# Patient Record
Sex: MALE | Race: BLACK OR AFRICAN AMERICAN | Employment: FULL TIME | URBAN - METROPOLITAN AREA
[De-identification: names, ages, dates, MRNs, and addresses within clinical notes are randomized per-mention and may not be internally consistent; named-entity substitution may affect disease eponyms.]

---

## 2017-01-25 ENCOUNTER — DOCUMENTATION ONLY (OUTPATIENT)
Dept: INTERNAL MEDICINE CLINIC | Age: 62
End: 2017-01-25

## 2017-01-25 ENCOUNTER — OFFICE VISIT (OUTPATIENT)
Dept: INTERNAL MEDICINE CLINIC | Age: 62
End: 2017-01-25

## 2017-01-25 VITALS
HEART RATE: 82 BPM | BODY MASS INDEX: 27.78 KG/M2 | OXYGEN SATURATION: 96 % | TEMPERATURE: 98 F | DIASTOLIC BLOOD PRESSURE: 80 MMHG | SYSTOLIC BLOOD PRESSURE: 130 MMHG | HEIGHT: 67 IN | WEIGHT: 177 LBS

## 2017-01-25 DIAGNOSIS — F33.1 MODERATE EPISODE OF RECURRENT MAJOR DEPRESSIVE DISORDER (HCC): ICD-10-CM

## 2017-01-25 DIAGNOSIS — M72.2 PLANTAR FASCIITIS OF RIGHT FOOT: ICD-10-CM

## 2017-01-25 DIAGNOSIS — E03.4 HYPOTHYROIDISM DUE TO ACQUIRED ATROPHY OF THYROID: ICD-10-CM

## 2017-01-25 DIAGNOSIS — E55.9 VITAMIN D DEFICIENCY: ICD-10-CM

## 2017-01-25 DIAGNOSIS — I10 ESSENTIAL HYPERTENSION: Primary | ICD-10-CM

## 2017-01-25 DIAGNOSIS — F51.01 PRIMARY INSOMNIA: ICD-10-CM

## 2017-01-25 DIAGNOSIS — E11.42 DIABETIC POLYNEUROPATHY ASSOCIATED WITH TYPE 2 DIABETES MELLITUS (HCC): ICD-10-CM

## 2017-01-25 LAB
CHOLEST SERPL-MCNC: 169 MG/DL
GLUCOSE POC: 213 MG/DL
HBA1C MFR BLD HPLC: 8.6 %
HDLC SERPL-MCNC: 65 MG/DL
LDL CHOLESTEROL POC: 90
NON-HDL GOAL (POC): 104
TCHOL/HDL RATIO (POC): 2.6
TRIGL SERPL-MCNC: 71 MG/DL

## 2017-01-25 RX ORDER — PAROXETINE 10 MG/1
10 TABLET, FILM COATED ORAL DAILY
Qty: 30 TAB | Refills: 3 | Status: SHIPPED | OUTPATIENT
Start: 2017-01-25 | End: 2017-01-25 | Stop reason: ALTCHOICE

## 2017-01-25 RX ORDER — ERGOCALCIFEROL 1.25 MG/1
50000 CAPSULE ORAL
Qty: 4 CAP | Refills: 12 | Status: SHIPPED | OUTPATIENT
Start: 2017-01-25

## 2017-01-25 RX ORDER — TRAZODONE HYDROCHLORIDE 100 MG/1
100 TABLET ORAL
Qty: 30 TAB | Refills: 3 | Status: SHIPPED | OUTPATIENT
Start: 2017-01-25 | End: 2017-04-26 | Stop reason: SDUPTHER

## 2017-01-25 RX ORDER — INSULIN GLARGINE 100 [IU]/ML
INJECTION, SOLUTION SUBCUTANEOUS
Qty: 3 VIAL | Refills: 3
Start: 2017-01-25

## 2017-01-25 NOTE — PROGRESS NOTES
This writer has spoken with pt during today's ov about his request for more Lantus. After contacting [employer] this writer is informed the application completed in June 2016 is good for one year but when pt needs a refill a new re-order form has to be completed. They only send 4 vials at a time. This writer has received a faxed copy of that re-order form, its been completed and faxed back to [facility]. Pt, Dr Noreen Marie and Dory Lacy are aware thus will monitor for it's arrival. (Completed and blank documents are being scanned into the chart- it's ok to make copies and change date as needed per [employer] rep).

## 2017-01-25 NOTE — PROGRESS NOTES
Negin Odom is a 59 y.o. male and presents with Insomnia and Diabetes  . Subjective:  Diabetes Mellitus Review:  He has diabetes mellitus. Diabetic ROS - medication compliance: compliant all of the time, diabetic diet compliance: compliant all of the time, home glucose monitoring: is performed. Known diabetic complications:numbness and tingling in hands and feet  Cardiovascular risk factors: family history, dyslipidemia, diabetes mellitus, obesity, hypertension  Current diabetic medications include /insulin   Eye exam current (within one year): no  Weight trend: stable  Prior visit with dietician: no  Current diet: \"healthy\" diet  in general  Current exercise: walking  Current monitoring regimen: home blood tests - daily  Home blood sugar records: trend: stable  Any episodes of hypoglycemia? no  Is He on ACE inhibitor or angiotensin II receptor blocker? Yes     Depression Review:  Patient is seen for followup of depression. Ongoing depressed mood, insomnia, psychomotor retardation, fatigue, feelings of worthlessness/guilt, difficulty concentrating and hopelessness Treatment includes no medication and no other therapies. He denies recurrent thoughts of death and suicidal thoughts without plan. He experiences the following side effects from the treatment: none. He states he is dealing with a lot of legal issues. Plantar fascia review:  Pains in thert foot  are report to be worse in the morning upon standing that are improved as the day progresses      Thyroid Review:  Patient is seen for followup of hypothyroidism. He has not been on his medication as he should have  Thyroid ROS: has fatigue,  heat/cold intolerance, bowel/skin changes or CVS symptoms.   Taking medication as prescribed  Last TSH was high     He has vitamin d deficiency      Review of Systems  Constitutional: negative for fevers, chills, anorexia and weight loss  Eyes:   negative for visual disturbance and irritation  ENT:   negative for tinnitus,sore throat,nasal congestion,ear pains. hoarseness  Respiratory:  negative for cough, hemoptysis, dyspnea,wheezing  CV:   negative for chest pain, palpitations, lower extremity edema  GI:   negative for nausea, vomiting, diarrhea, abdominal pain,melena  Endo:               negative for polyuria,polydipsia,polyphagia,heat intolerance  Genitourinary: negative for frequency, dysuria and hematuria  Integument:  negative for rash and pruritus  Hematologic:  negative for easy bruising and gum/nose bleeding  Musculoskel:myalgias, arthralgias,joint pain. rt. foot tenderness to palpation plantar surface  Neurological:  negative for headaches, dizziness, vertigo, memory problems and gait   Behavl/Psych: feelings of anxiety, depression, mood changes    Past Medical History   Diagnosis Date    Arthritis      knees bilaterally    Diabetes ([address]      type 2, new dx    GERD (gastroesophageal reflux disease)      History reviewed. No pertinent past surgical history. Social History     Social History    Marital status: SINGLE     Spouse name: N/A    Number of children: N/A    Years of education: N/A     Social History Main Topics    Smoking status: Former Smoker     Packs/day: 1.00     Years: 13.00     Quit date: 2/13/2000    Smokeless tobacco: Never Used    Alcohol use No    Drug use: No    Sexual activity: Yes     Partners: Female     Birth control/ protection: None     Other Topics Concern    None     Social History Narrative     Family History   Problem Relation Age of Onset    Diabetes Mother     Heart Disease Mother     Heart Disease Maternal Grandmother     Cancer Maternal Grandmother      Current Outpatient Prescriptions   Medication Sig Dispense Refill    insulin glargine (LANTUS) 100 unit/mL injection 36 units sc daily 3 Vial 3    traZODone (DESYREL) 100 mg tablet Take 1 Tab by mouth nightly.  30 Tab 3    Insulin Needles, Disposable, (AZUL PEN NEEDLE) 32 gauge x 5/32\" ndle 1 Each by Does Not Apply route nightly. Use daily to inject insulin at bedtime. 100 Pen Needle 12    Insulin Syringe-Needle U-100 (BD INSULIN SYRINGE) 1 mL 26 x 1/2\" syrg 1 Each by Does Not Apply route daily. 100 Syringe 3    levothyroxine (SYNTHROID) 100 mcg tablet Take 1 Tab by mouth Daily (before breakfast). 30 Tab 12    sildenafil citrate (VIAGRA) 100 mg tablet Take 1 Tab by mouth as needed. 8 Tab 12    colchicine (COLCRYS) 0.6 mg tablet Take 1 Tab by mouth daily. 7 Tab 3    losartan (COZAAR) 25 mg tablet Take 1 Tab by mouth daily. 30 Tab 3    tadalafil (CIALIS) 5 mg tablet Take 1 Tab by mouth as needed. Take one hour prior to your activity 10 Tab 0    acetaminophen (TYLENOL ARTHRITIS PAIN) 650 mg CR tablet Take 650 mg by mouth every six (6) hours as needed for Pain.  meloxicam (MOBIC) 15 mg tablet   3    BD INSULIN SYRINGE ULTRA-FINE 1 mL 30 x 1/2\" syrg   1    naproxen (NAPROSYN) 500 mg tablet Take 1 Tab by mouth two (2) times daily (with meals). 20 Tab 0    gabapentin (NEURONTIN) 100 mg capsule Take 1 Cap by mouth three (3) times daily. 90 Cap 0    VITAMIN D2 50,000 unit capsule TAKE ONE CAPSULE by MOUTH every 7 DAYS FOR 12 doses 12 Cap 0    ketoconazole (NIZORAL) 2 % topical cream Apply  to affected area daily. 15 g 0    albuterol (PROVENTIL HFA, VENTOLIN HFA, PROAIR HFA) 90 mcg/actuation inhaler Take 1 puff by inhalation every four (4) hours as needed for Wheezing. 1 Inhaler 0    baclofen (LIORESAL) 10 mg tablet Take 0.5 tablets by mouth three (3) times daily. 20 tablet 0    L. acidoph & paracasei- S therm- Bifido (JESUS-Q) 8 billion cell cap cap Take 1 Cap by mouth daily.  30 Cap 0    glucose blood VI test strips (ONE TOUCH TEST) strip Use daily as directed 1 Package 11     No Known Allergies    Objective:  Visit Vitals    /80    Pulse 82    Temp 98 °F (36.7 °C) (Oral)    Ht 5' 7\" (1.702 m)    Wt 177 lb (80.3 kg)    SpO2 96%    BMI 27.72 kg/m2     Physical Exam:   General appearance - alert, well appearing, and in no distress  Mental status - alert, oriented to person, place, and time  EYE-TRAVIS, EOMI, corneas normal, no foreign bodies  ENT-ENT exam normal, no neck nodes or sinus tenderness  Nose - normal and patent, no erythema, discharge or polyps  Mouth - mucous membranes moist, pharynx normal without lesions  Neck - supple, no significant adenopathy   Chest - clear to auscultation, no wheezes, rales or rhonchi, symmetric air entry   Heart - normal rate, regular rhythm, normal S1, S2, no murmurs, rubs, clicks or gallops   Abdomen - soft, nontender, nondistended, no masses or organomegaly  Lymph- no adenopathy palpable  Ext-peripheral pulses normal, no pedal edema, no clubbing or cyanosis  Skin-Warm and dry. no hyperpigmentation, vitiligo, or suspicious lesions  Neuro -alert, oriented, normal speech, no focal findings or movement disorder noted  Neck-normal C-spine, no tenderness, full ROM without pain      Results for orders placed or performed in visit on 01/25/17   AMB POC GLUCOSE BLOOD, BY GLUCOSE MONITORING DEVICE   Result Value Ref Range    Glucose  mg/dL   AMB POC HEMOGLOBIN A1C   Result Value Ref Range    Hemoglobin A1c (POC) 8.6 %   AMB POC LIPID PROFILE   Result Value Ref Range    Cholesterol (POC) 169     Triglycerides (POC) 71     HDL Cholesterol (POC) 65     LDL Cholesterol (POC) 90     Non-HDL Goal (POC) 104     TChol/HDL Ratio (POC) 2.6        Assessment/Plan:    ICD-10-CM ICD-9-CM    1. Essential hypertension I10 401.9 AMB POC GLUCOSE BLOOD, BY GLUCOSE MONITORING DEVICE      AMB POC HEMOGLOBIN A1C      AMB POC URINE, MICROALBUMIN, SEMIQUANT (3 RESULTS)      AMB POC LIPID PROFILE   2. IDDM (insulin dependent diabetes mellitus) ([facility]) E11.9 250.00 AMB POC GLUCOSE BLOOD, BY GLUCOSE MONITORING DEVICE    Z79.4 V58.67 AMB POC HEMOGLOBIN A1C      AMB POC URINE, MICROALBUMIN, SEMIQUANT (3 RESULTS)      AMB POC LIPID PROFILE      METABOLIC PANEL, COMPREHENSIVE      CBC W/O DIFF   3. Moderate episode of recurrent major depressive disorder (HCC) F33.1 296.32    4. Primary insomnia F51.01 307.42      Orders Placed This Encounter    METABOLIC PANEL, COMPREHENSIVE    CBC W/O DIFF    AMB POC GLUCOSE BLOOD, BY GLUCOSE MONITORING DEVICE    AMB POC HEMOGLOBIN A1C    AMB POC URINE, MICROALBUMIN, SEMIQUANT (3 RESULTS)    AMB POC LIPID PROFILE    insulin glargine (LANTUS) 100 unit/mL injection     Si units sc daily     Dispense:  3 Vial     Refill:  3    DISCONTD: PARoxetine (PAXIL) 10 mg tablet     Sig: Take 1 Tab by mouth daily. Dispense:  30 Tab     Refill:  3    traZODone (DESYREL) 100 mg tablet     Sig: Take 1 Tab by mouth nightly. Dispense:  30 Tab     Refill:  3     increase physical activity,Take 81mg aspirin daily  Patient Instructions   APXhart Activation    Thank you for requesting access to [facility]. Please follow the instructions below to securely access and download your online medical record. [facility]ASBOX allows you to send messages to your doctor, view your test results, renew your prescriptions, schedule appointments, and more. How Do I Sign Up? 1. In your internet browser, go to [email or web address]  2. Click on the First Time User? Click Here link in the Sign In box. You will be redirect to the New Member Sign Up page. 3. Enter your BAASBOX Access Code exactly as it appears below. You will not need to use this code after youve completed the sign-up process. If you do not sign up before the expiration date, you must request a new code. [facility]BOX Access Code: Activation code not generated  Current [facility] Status: Patient Declined (This is the date your [facility]ASBOX access code will )    4. Enter the last four digits of your Social Security Number (xxxx) and Date of Birth (mm/dd/yyyy) as indicated and click Submit. You will be taken to the next sign-up page. 5. Create a BAASBOX ID.  This will be your [facility] login ID and cannot be changed, so think of one that is secure and easy to remember. 6. Create a [facility]over password. You can change your password at any time. 7. Enter your Password Reset Question and Answer. This can be used at a later time if you forget your password. 8. Enter your e-mail address. You will receive e-mail notification when new information is available in [address] 9. Click Sign Up. You can now view and download portions of your medical record. 10. Click the Download Summary menu link to download a portable copy of your medical information. Additional Information    If you have questions, please visit the Frequently Asked Questions section of the [facility]over website at [email or web address] Remember, [facility]over is NOT to be used for urgent needs. For medical emergencies, dial 911. Follow-up Disposition:  Return in about 2 weeks (around 2/8/2017), or if symptoms worsen or fail to improve. I have reviewed with the patient details of the assessment and plan and all questions were answered. Relevent patient education was performed    An After Visit Summary was printed and given to the patient.

## 2017-01-25 NOTE — MR AVS SNAPSHOT
Visit Information Date & Time Provider Department Dept. Phone Encounter #  
 1/25/2017  2:30 PM Duyen Galdamez MD [address] [place] [address] [phone] [id number] Follow-up Instructions Return in about 2 weeks (around 2/8/2017), or if symptoms worsen or fail to improve. Upcoming Health Maintenance Date Due  
 EYE EXAM RETINAL OR DILATED Q1 6/23/2016 DTaP/Tdap/Td series (1 - Tdap) 9/14/2017* FOOT EXAM Q1 6/29/2017 HEMOGLOBIN A1C Q6M 7/25/2017 MICROALBUMIN Q1 1/25/2018 LIPID PANEL Q1 1/25/2018 COLONOSCOPY 6/23/2025 *Topic was postponed. The date shown is not the original due date. Allergies as of 1/25/2017  Review Complete On: 1/25/2017 By: Janell Lanes, LPN No Known Allergies Current Immunizations  Reviewed on 3/17/2014 Name Date Influenza Vaccine PF 2/12/2014 12:09 PM  
 Pneumococcal Polysaccharide (PPSV-23) 2/12/2014 12:06 PM  
  
 Not reviewed this visit You Were Diagnosed With   
  
 Codes Comments Essential hypertension    -  Primary ICD-10-CM: I10 
ICD-9-CM: 401.9 IDDM (insulin dependent diabetes mellitus) ([facility]     ICD-10-CM: E11.9, Z79.4 ICD-9-CM: 250.00, V58.67 Moderate episode of recurrent major depressive disorder (HCC)     ICD-10-CM: F33.1 ICD-9-CM: 296.32 Primary insomnia     ICD-10-CM: F51.01 
ICD-9-CM: 307.42 Plantar fasciitis of right foot     ICD-10-CM: M72.2 ICD-9-CM: 728.71 Diabetic polyneuropathy associated with type 2 diabetes mellitus ([facility]     ICD-10-CM: E11.42 
ICD-9-CM: 250.60, 357.2 Hypothyroidism due to acquired atrophy of thyroid     ICD-10-CM: E03.4 ICD-9-CM: 244.8, 246.8 Vitamin D deficiency     ICD-10-CM: E55.9 ICD-9-CM: 268.9 Vitals BP Pulse Temp Height(growth percentile) Weight(growth percentile) SpO2  
 130/80 82 98 °F (36.7 °C) (Oral) 5' 7\" (1.702 m) 177 lb (80.3 kg) 96% BMI Smoking Status 27.72 kg/m2 Former Smoker BMI and BSA Data Body Mass Index Body Surface Area  
 27.72 kg/m 2 1.95 m 2 Preferred Pharmacy Pharmacy Name Phone [address] [facility], [address] [address] [address]. [phone] Your Updated Medication List  
  
   
This list is accurate as of: 1/25/17  3:19 PM.  Always use your most recent med list.  
  
  
  
  
 albuterol 90 mcg/actuation inhaler Commonly known as:  PROVENTIL HFA, VENTOLIN HFA, PROAIR HFA Take 1 puff by inhalation every four (4) hours as needed for Wheezing. baclofen 10 mg tablet Commonly known as:  LIORESAL Take 0.5 tablets by mouth three (3) times daily. colchicine 0.6 mg tablet Commonly known as:  COLCRYS Take 1 Tab by mouth daily. gabapentin 100 mg capsule Commonly known as:  NEURONTIN Take 1 Cap by mouth three (3) times daily. glucose blood VI test strips strip Commonly known as:  ONE TOUCH TEST Use daily as directed  
  
 insulin glargine 100 unit/mL injection Commonly known as:  LANTUS  
36 units sc daily Insulin Needles (Disposable) 32 gauge x 5/32\" Ndle Commonly known as:  Alyson Pen Needle 1 Each by Does Not Apply route nightly. Use daily to inject insulin at bedtime. * Insulin Syringe-Needle U-100 1 mL 26 x 1/2\" Syrg Commonly known as:  BD INSULIN SYRINGE  
1 Each by Does Not Apply route daily. * BD INSULIN SYRINGE ULTRA-FINE 1 mL 30 gauge x 1/2\" Syrg Generic drug:  Insulin Syringe-Needle U-100  
  
 ketoconazole 2 % topical cream  
Commonly known as:  NIZORAL Apply  to affected area daily. L. acidoph & paracasei- S therm- Bifido 8 billion cell Cap cap Commonly known as:  JESUS-Q/RISAQUAD Take 1 Cap by mouth daily. levothyroxine 100 mcg tablet Commonly known as:  SYNTHROID Take 1 Tab by mouth Daily (before breakfast). losartan 25 mg tablet Commonly known as:  COZAAR Take 1 Tab by mouth daily. meloxicam 15 mg tablet Commonly known as:  MOBIC  
  
 naproxen 500 mg tablet Commonly known as:  NAPROSYN Take 1 Tab by mouth two (2) times daily (with meals). sildenafil citrate 100 mg tablet Commonly known as:  VIAGRA Take 1 Tab by mouth as needed. tadalafil 5 mg tablet Commonly known as:  CIALIS Take 1 Tab by mouth as needed. Take one hour prior to your activity  
  
 traZODone 100 mg tablet Commonly known as:  Jayro Rasher Take 1 Tab by mouth nightly. TYLENOL ARTHRITIS PAIN 650 mg CR tablet Generic drug:  acetaminophen Take 650 mg by mouth every six (6) hours as needed for Pain. * VITAMIN D2 50,000 unit capsule Generic drug:  ergocalciferol TAKE ONE CAPSULE by MOUTH every 7 DAYS FOR 12 doses * ergocalciferol 50,000 unit capsule Commonly known as:  ERGOCALCIFEROL Take 1 Cap by mouth every seven (7) days. * Notice: This list has 4 medication(s) that are the same as other medications prescribed for you. Read the directions carefully, and ask your doctor or other care provider to review them with you. Prescriptions Sent to Pharmacy Refills  
 traZODone (DESYREL) 100 mg tablet 3 Sig: Take 1 Tab by mouth nightly. Class: Normal  
 Pharmacy: [address] [place], [place] - [address] Ph #: [phone] Route: Oral  
 ergocalciferol (ERGOCALCIFEROL) 50,000 unit capsule 12 Sig: Take 1 Cap by mouth every seven (7) days. Class: Normal  
 Pharmacy: [address] [place], [place] - [address] Ph #: [phone] Route: Oral  
  
We Performed the Following AMB POC GLUCOSE BLOOD, BY GLUCOSE MONITORING DEVICE [81384 CPT(R)] AMB POC HEMOGLOBIN A1C [22122 CPT(R)] AMB POC LIPID PROFILE [40883 CPT(R)] CBC W/O DIFF [93076 CPT(R)] METABOLIC PANEL, COMPREHENSIVE [06511 CPT(R)] Follow-up Instructions  Return in about 2 weeks (around 2/8/2017), or if symptoms worsen or fail to improve. Patient Instructions MyChart Activation Thank you for requesting access to [employer]. Please follow the instructions below to securely access and download your online medical record. [employer] allows you to send messages to your doctor, view your test results, renew your prescriptions, schedule appointments, and more. How Do I Sign Up? 1. In your internet browser, go to [email or web address] 
2. Click on the First Time User? Click Here link in the Sign In box. You will be redirect to the New Member Sign Up page. 3. Enter your [employer] Access Code exactly as it appears below. You will not need to use this code after youve completed the sign-up process. If you do not sign up before the expiration date, you must request a new code. [employer] Access Code: Activation code not generated Current [employer] Status: Patient Declined (This is the date your [employer] access code will ) 4. Enter the last four digits of your Social Security Number (xxxx) and Date of Birth (mm/dd/yyyy) as indicated and click Submit. You will be taken to the next sign-up page. 5. Create a [employer] ID. This will be your [employer] login ID and cannot be changed, so think of one that is secure and easy to remember. 6. Create a [employer] password. You can change your password at any time. 7. Enter your Password Reset Question and Answer. This can be used at a later time if you forget your password. 8. Enter your e-mail address. You will receive e-mail notification when new information is available in [address] 9. Click Sign Up. You can now view and download portions of your medical record. 10. Click the Download Summary menu link to download a portable copy of your medical information. Additional Information If you have questions, please visit the Frequently Asked Questions section of the [employer] website at [email or web address] Remember, MyChart is NOT to be used for urgent needs. For medical emergencies, dial 911. Please provide this summary of care documentation to your next provider. If you have any questions after today's visit, please call [phone].

## 2017-01-25 NOTE — PATIENT INSTRUCTIONS
[employer] Activation    Thank you for requesting access to [facility]. Please follow the instructions below to securely access and download your online medical record. [employer] allows you to send messages to your doctor, view your test results, renew your prescriptions, schedule appointments, and more. How Do I Sign Up? 1. In your internet browser, go to [email or web address]  2. Click on the First Time User? Click Here link in the Sign In box. You will be redirect to the New Member Sign Up page. 3. Enter your [employer] Access Code exactly as it appears below. You will not need to use this code after youve completed the sign-up process. If you do not sign up before the expiration date, you must request a new code. [employer] Access Code: Activation code not generated  Current [employer] Status: Patient Declined (This is the date your [employer] access code will )    4. Enter the last four digits of your Social Security Number (xxxx) and Date of Birth (mm/dd/yyyy) as indicated and click Submit. You will be taken to the next sign-up page. 5. Create a [employer] ID. This will be your [employer] login ID and cannot be changed, so think of one that is secure and easy to remember. 6. Create a [employer] password. You can change your password at any time. 7. Enter your Password Reset Question and Answer. This can be used at a later time if you forget your password. 8. Enter your e-mail address. You will receive e-mail notification when new information is available in [address] 9. Click Sign Up. You can now view and download portions of your medical record. 10. Click the Download Summary menu link to download a portable copy of your medical information. Additional Information    If you have questions, please visit the Frequently Asked Questions section of the [employer] website at [email or web address] Remember, [employer] is NOT to be used for urgent needs. For medical emergencies, dial 911.

## 2017-02-08 ENCOUNTER — OFFICE VISIT (OUTPATIENT)
Dept: INTERNAL MEDICINE CLINIC | Age: 62
End: 2017-02-08

## 2017-02-08 VITALS
RESPIRATION RATE: 14 BRPM | TEMPERATURE: 97.4 F | HEART RATE: 81 BPM | DIASTOLIC BLOOD PRESSURE: 82 MMHG | BODY MASS INDEX: 28.11 KG/M2 | SYSTOLIC BLOOD PRESSURE: 108 MMHG | OXYGEN SATURATION: 97 % | WEIGHT: 179.1 LBS | HEIGHT: 67 IN

## 2017-02-08 DIAGNOSIS — M17.12 PRIMARY OSTEOARTHRITIS OF LEFT KNEE: Primary | ICD-10-CM

## 2017-02-08 DIAGNOSIS — M65.341 TRIGGER RING FINGER OF RIGHT HAND: ICD-10-CM

## 2017-02-08 DIAGNOSIS — E11.9 TYPE 2 DIABETES MELLITUS WITHOUT COMPLICATION, WITH LONG-TERM CURRENT USE OF INSULIN (HCC): ICD-10-CM

## 2017-02-08 DIAGNOSIS — Z79.4 TYPE 2 DIABETES MELLITUS WITHOUT COMPLICATION, WITH LONG-TERM CURRENT USE OF INSULIN (HCC): ICD-10-CM

## 2017-02-08 RX ORDER — TRIAMCINOLONE ACETONIDE 40 MG/ML
40 INJECTION, SUSPENSION INTRA-ARTICULAR; INTRAMUSCULAR ONCE
Qty: 1 VIAL | Refills: 0
Start: 2017-02-08 | End: 2017-02-08

## 2017-02-08 NOTE — PROGRESS NOTES
1. Have you been to the ER, urgent care clinic since your last visit? Hospitalized since your last visit? No    2. Have you seen or consulted any other health care providers outside of the [address] [place] [address] since your last visit? Include any pap smears or colon screening. No     Chief Complaint   Patient presents with    Labs     states needs some labwork done today and would like to discuss some other issues with provider.       Not fasting

## 2017-02-08 NOTE — PROGRESS NOTES
Douglas Romano is a 59 y.o. male and presents with Labs (states needs some labwork done today and would like to discuss some other issues with provider. )  . Subjective:  He has a rt 4th finger trigger in nature. Knee pain Review:  Patient complains of right knee pain. Onset of the symptoms was months  ago. Inciting event: longstanding problem which has been getting worse. Current symptoms include pain location: both: medial and swelling. none,  Aggravating symptoms: going up and down stairs, walking, lateral movements, any weight bearing. Patient's overall course: gradually worsening Patient has had prior knee problems. Evaluation to date: none. Treatment to date:NSAIDS        Thyroid Review:  Patient is seen for followup of hypothyroidism. Thyroid ROS: denies fatigue, weight changes, heat/cold intolerance, bowel/skin changes or CVS symptoms. Taking medication as prescribed      GERD Review:   Patient has a history of gastroesophageal reflux with heartburn. Symptoms have been present for a few months. He denies dysphagia. He  has not lost weight. He denies melena, hematochezia, hematemesis, and coffee ground emesis. This has been associated with fullness after meals. He denies abdominal bloating and none. Medical therapy in the past has included proton pump inhibitors. Review of Systems  Constitutional: negative for fevers, chills, anorexia and weight loss  Eyes:   negative for visual disturbance and irritation  ENT:   negative for tinnitus,sore throat,nasal congestion,ear pains. hoarseness  Respiratory:  negative for cough, hemoptysis, dyspnea,wheezing  CV:   negative for chest pain, palpitations, lower extremity edema  GI:   negative for nausea, vomiting, diarrhea, abdominal pain,melena  Endo:               negative for polyuria,polydipsia,polyphagia,heat intolerance  Genitourinary: negative for frequency, dysuria and hematuria  Integument:  negative for rash and pruritus  Hematologic: negative for easy bruising and gum/nose bleeding  Musculoskel: myalgias, arthralgias,  joint pain  Neurological:  negative for headaches, dizziness, vertigo, memory problems and gait   Behavl/Psych: negative for feelings of anxiety, depression, mood changes    Past Medical History   Diagnosis Date    Arthritis      knees bilaterally    Diabetes ([address]      type 2, new dx    GERD (gastroesophageal reflux disease)      History reviewed. No pertinent past surgical history. Social History     Social History    Marital status: SINGLE     Spouse name: N/A    Number of children: N/A    Years of education: N/A     Social History Main Topics    Smoking status: Former Smoker     Packs/day: 1.00     Years: 13.00     Quit date: 2/13/2000    Smokeless tobacco: Never Used    Alcohol use No    Drug use: No    Sexual activity: Yes     Partners: Female     Birth control/ protection: None     Other Topics Concern    None     Social History Narrative     Family History   Problem Relation Age of Onset    Diabetes Mother     Heart Disease Mother     Heart Disease Maternal Grandmother     Cancer Maternal Grandmother      Current Outpatient Prescriptions   Medication Sig Dispense Refill    triamcinolone acetonide (KENALOG-40) 40 mg/mL injection 1 mL by Intra artICUlar route once for 1 dose. 1 Vial 0    insulin glargine (LANTUS) 100 unit/mL injection 36 units sc daily 3 Vial 3    traZODone (DESYREL) 100 mg tablet Take 1 Tab by mouth nightly. 30 Tab 3    ergocalciferol (ERGOCALCIFEROL) 50,000 unit capsule Take 1 Cap by mouth every seven (7) days. 4 Cap 12    Insulin Needles, Disposable, (AZUL PEN NEEDLE) 32 gauge x 5/32\" ndle 1 Each by Does Not Apply route nightly. Use daily to inject insulin at bedtime. 100 Pen Needle 12    levothyroxine (SYNTHROID) 100 mcg tablet Take 1 Tab by mouth Daily (before breakfast). 30 Tab 12    losartan (COZAAR) 25 mg tablet Take 1 Tab by mouth daily.  30 Tab 3    BD INSULIN SYRINGE ULTRA-FINE 1 mL 30 x 1/2\" syrg   1    naproxen (NAPROSYN) 500 mg tablet Take 1 Tab by mouth two (2) times daily (with meals). 20 Tab 0    gabapentin (NEURONTIN) 100 mg capsule Take 1 Cap by mouth three (3) times daily. 90 Cap 0    VITAMIN D2 50,000 unit capsule TAKE ONE CAPSULE by MOUTH every 7 DAYS FOR 12 doses 12 Cap 0    Insulin Syringe-Needle U-100 (BD INSULIN SYRINGE) 1 mL 26 x 1/2\" syrg 1 Each by Does Not Apply route daily. 100 Syringe 3    glucose blood VI test strips (ONE TOUCH TEST) strip Use daily as directed 1 Package 11    sildenafil citrate (VIAGRA) 100 mg tablet Take 1 Tab by mouth as needed. 8 Tab 12    colchicine (COLCRYS) 0.6 mg tablet Take 1 Tab by mouth daily. 7 Tab 3    tadalafil (CIALIS) 5 mg tablet Take 1 Tab by mouth as needed. Take one hour prior to your activity 10 Tab 0    acetaminophen (TYLENOL ARTHRITIS PAIN) 650 mg CR tablet Take 650 mg by mouth every six (6) hours as needed for Pain.  meloxicam (MOBIC) 15 mg tablet   3    ketoconazole (NIZORAL) 2 % topical cream Apply  to affected area daily. 15 g 0    albuterol (PROVENTIL HFA, VENTOLIN HFA, PROAIR HFA) 90 mcg/actuation inhaler Take 1 puff by inhalation every four (4) hours as needed for Wheezing. 1 Inhaler 0    baclofen (LIORESAL) 10 mg tablet Take 0.5 tablets by mouth three (3) times daily. 20 tablet 0    L. acidoph & paracasei- S therm- Bifido (JESUS-Q) 8 billion cell cap cap Take 1 Cap by mouth daily.  30 Cap 0     No Known Allergies    Objective:  Visit Vitals    /82 (BP 1 Location: Left arm, BP Patient Position: At rest)    Pulse 81    Temp 97.4 °F (36.3 °C) (Oral)    Resp 14    Ht 5' 7\" (1.702 m)    Wt 179 lb 1.6 oz (81.2 kg)    SpO2 97%    BMI 28.05 kg/m2     Physical Exam:   General appearance - alert, well appearing, and in no distress  Mental status - alert, oriented to person, place, and time  EYE-TRAVIS, EOMI, corneas normal, no foreign bodies  ENT-ENT exam normal, no neck nodes or sinus tenderness  Nose - normal and patent, no erythema, discharge or polyps  Mouth - mucous membranes moist, pharynx normal without lesions  Neck - supple, no significant adenopathy   Chest - clear to auscultation, no wheezes, rales or rhonchi, symmetric air entry   Heart - normal rate, regular rhythm, normal S1, S2, no murmurs, rubs, clicks or gallops   Abdomen - soft, nontender, nondistended, no masses or organomegaly  Lymph- no adenopathy palpable  Ext-peripheral pulses normal, no pedal edema, no clubbing or cyanosis  Skin-Warm and dry. no hyperpigmentation, vitiligo, or suspicious lesions  Neuro -alert, oriented, normal speech, no focal findings or movement disorder noted  Neck-normal C-spine, no tenderness, full ROM without pain  Feet-no nail deformities or callus formation with good pulses noted  Lt.knee-medial joint line tenderness noted      Results for orders placed or performed in visit on 17   AMB POC GLUCOSE BLOOD, BY GLUCOSE MONITORING DEVICE   Result Value Ref Range    Glucose  mg/dL   AMB POC HEMOGLOBIN A1C   Result Value Ref Range    Hemoglobin A1c (POC) 8.6 %   AMB POC LIPID PROFILE   Result Value Ref Range    Cholesterol (POC) 169     Triglycerides (POC) 71     HDL Cholesterol (POC) 65     LDL Cholesterol (POC) 90     Non-HDL Goal (POC) 104     TChol/HDL Ratio (POC) 2.6        Assessment/Plan:    ICD-10-CM ICD-9-CM    1. Primary osteoarthritis of left knee M17.12 715.16 SD DRAIN/INJECT LARGE JOINT/BURSA   2. Trigger ring finger of right hand M65.341 727.03    3. Type 2 diabetes mellitus without complication, with long-term current use of insulin ([facility]) E11.9 250.00     Z79.4 V58.67      Orders Placed This Encounter    SD DRAIN/INJECT LARGE JOINT/BURSA    triamcinolone acetonide (KENALOG-40) 40 mg/mL injection     Si mL by Intra artICUlar route once for 1 dose.      Dispense:  1 Vial     Refill:  0     lose weight, increase physical activity, follow low fat diet, follow low salt diet,Take 81mg aspirin daily  Indications:   Symptomatic relief of pain    Procedure:  After consent was obtained, using sterile technique the left knee joint was prepped using alcohol. Local anesthetic used: 1% lidocaine. . The joint was entered Kenalog 40 mg was mixed with 1% lidocaine 3 ml  and injected into the joint and the needle withdrawn. The procedure was well tolerated. The patient is asked to continue to rest the joint for a few more days before resuming regular activities. It may be more painful for the first 1-2 days. Watch for fever, or increased swelling or persistent pain in the joint. Call or return to clinic prn if such symptoms occur or there is failure to improve as anticipated. [facility]  OFFICE PROCEDURE PROGRESS NOTE        Chart reviewed for the following:   Araseli Matias MD, have reviewed the History, Physical and updated the Allergic reactions for [place]     TIME OUT performed immediately prior to start of procedure:   INayla MD, have performed the following reviews on [address] [place] [address] prior to the start of the procedure:            * Patient was identified by name and date of birth   * Agreement on procedure being performed was verified  * Risks and Benefits explained to the patient  * Procedure site verified and marked as necessary  * Patient was positioned for comfort       Time: 3:39pm      Date of procedure: 2/8/2017    Procedure performed by:  Nayla Matamoros MD    Patient assisted by: self    How tolerated by patient: tolerated the procedure well with no complications    Comments: none                Patient Instructions   MyChart Activation    Thank you for requesting access to [employer]. Please follow the instructions below to securely access and download your online medical record.  [employer] allows you to send messages to your doctor, view your test results, renew your prescriptions, schedule appointments, and more. How Do I Sign Up? 1. In your internet browser, go to [email or web address]  2. Click on the First Time User? Click Here link in the Sign In box. You will be redirect to the New Member Sign Up page. 3. Enter your [employer] Access Code exactly as it appears below. You will not need to use this code after youve completed the sign-up process. If you do not sign up before the expiration date, you must request a new code. MyChart Access Code: Activation code not generated  Current [employer] Status: Patient Declined (This is the date your MyChart access code will )    4. Enter the last four digits of your Social Security Number (xxxx) and Date of Birth (mm/dd/yyyy) as indicated and click Submit. You will be taken to the next sign-up page. 5. Create a EasyPostt ID. This will be your [employer] login ID and cannot be changed, so think of one that is secure and easy to remember. 6. Create a [employer] password. You can change your password at any time. 7. Enter your Password Reset Question and Answer. This can be used at a later time if you forget your password. 8. Enter your e-mail address. You will receive e-mail notification when new information is available in [address] 9. Click Sign Up. You can now view and download portions of your medical record. 10. Click the Download Summary menu link to download a portable copy of your medical information. Additional Information    If you have questions, please visit the Frequently Asked Questions section of the [employer] website at [email or web address] Remember, [employer] is NOT to be used for urgent needs. For medical emergencies, dial 911. Follow-up Disposition:  Return in about 4 weeks (around 3/8/2017), or if symptoms worsen or fail to improve. I have reviewed with the patient details of the assessment and plan and all questions were answered.  Relevent patient education was performed    An After Visit Summary was printed and given to the patient.

## 2017-02-08 NOTE — PATIENT INSTRUCTIONS
[employer] Activation    Thank you for requesting access to [employer]. Please follow the instructions below to securely access and download your online medical record. [employer] allows you to send messages to your doctor, view your test results, renew your prescriptions, schedule appointments, and more. How Do I Sign Up? 1. In your internet browser, go to [email or web address]  2. Click on the First Time User? Click Here link in the Sign In box. You will be redirect to the New Member Sign Up page. 3. Enter your [employer] Access Code exactly as it appears below. You will not need to use this code after youve completed the sign-up process. If you do not sign up before the expiration date, you must request a new code. [employer] Access Code: Activation code not generated  Current [facility] Status: Patient Declined (This is the date your [employer] access code will )    4. Enter the last four digits of your Social Security Number (xxxx) and Date of Birth (mm/dd/yyyy) as indicated and click Submit. You will be taken to the next sign-up page. 5. Create a [facility] ID. This will be your [employer] login ID and cannot be changed, so think of one that is secure and easy to remember. 6. Create a [employer] password. You can change your password at any time. 7. Enter your Password Reset Question and Answer. This can be used at a later time if you forget your password. 8. Enter your e-mail address. You will receive e-mail notification when new information is available in [address] 9. Click Sign Up. You can now view and download portions of your medical record. 10. Click the Download Summary menu link to download a portable copy of your medical information. Additional Information    If you have questions, please visit the Frequently Asked Questions section of the [employer] website at [email or web address] Remember, [employer] is NOT to be used for urgent needs. For medical emergencies, dial 911.

## 2017-02-08 NOTE — MR AVS SNAPSHOT
Visit Information Date & Time Provider Department Dept. Phone Encounter #  
 2/8/2017  2:00 PM Prieto Parker MD [facility] [phone] [id number] Follow-up Instructions Return in about 4 weeks (around 3/8/2017), or if symptoms worsen or fail to improve. Upcoming Health Maintenance Date Due  
 EYE EXAM RETINAL OR DILATED Q1 6/23/2016 DTaP/Tdap/Td series (1 - Tdap) 9/14/2017* FOOT EXAM Q1 6/29/2017 HEMOGLOBIN A1C Q6M 7/25/2017 MICROALBUMIN Q1 1/25/2018 LIPID PANEL Q1 1/25/2018 COLONOSCOPY 6/23/2025 *Topic was postponed. The date shown is not the original due date. Allergies as of 2/8/2017  Review Complete On: 2/8/2017 By: Vicente Tucker LPN No Known Allergies Current Immunizations  Reviewed on 3/17/2014 Name Date Influenza Vaccine PF 2/12/2014 12:09 PM  
 Pneumococcal Polysaccharide (PPSV-23) 2/12/2014 12:06 PM  
  
 Not reviewed this visit You Were Diagnosed With   
  
 Codes Comments Primary osteoarthritis of left knee    -  Primary ICD-10-CM: M17.12 
ICD-9-CM: 715.16 Trigger ring finger of right hand     ICD-10-CM: M65.341 ICD-9-CM: 727.03 Type 2 diabetes mellitus without complication, with long-term current use of insulin ([facility])     ICD-10-CM: E11.9, Z79.4 ICD-9-CM: 250.00, V58.67 Vitals BP Pulse Temp Resp Height(growth percentile) Weight(growth percentile) 108/82 (BP 1 Location: Left arm, BP Patient Position: At rest) 81 97.4 °F (36.3 °C) (Oral) 14 5' 7\" (1.702 m) 179 lb 1.6 oz (81.2 kg) SpO2 BMI Smoking Status 97% 28.05 kg/m2 Former Smoker Vitals History BMI and BSA Data Body Mass Index Body Surface Area 28.05 kg/m 2 1.96 m 2 Preferred Pharmacy Pharmacy Name Phone [facility], [address] [place] [address] [address]. [phone] Your Updated Medication List  
  
   
 This list is accurate as of: 2/8/17  3:45 PM.  Always use your most recent med list.  
  
  
  
  
 albuterol 90 mcg/actuation inhaler Commonly known as:  PROVENTIL HFA, VENTOLIN HFA, PROAIR HFA Take 1 puff by inhalation every four (4) hours as needed for Wheezing. baclofen 10 mg tablet Commonly known as:  LIORESAL Take 0.5 tablets by mouth three (3) times daily. colchicine 0.6 mg tablet Commonly known as:  COLCRYS Take 1 Tab by mouth daily. gabapentin 100 mg capsule Commonly known as:  NEURONTIN Take 1 Cap by mouth three (3) times daily. glucose blood VI test strips strip Commonly known as:  ONE TOUCH TEST Use daily as directed  
  
 insulin glargine 100 unit/mL injection Commonly known as:  LANTUS  
36 units sc daily Insulin Needles (Disposable) 32 gauge x 5/32\" Ndle Commonly known as:  Alyson Pen Needle 1 Each by Does Not Apply route nightly. Use daily to inject insulin at bedtime. * Insulin Syringe-Needle U-100 1 mL 26 x 1/2\" Syrg Commonly known as:  BD INSULIN SYRINGE  
1 Each by Does Not Apply route daily. * BD INSULIN SYRINGE ULTRA-FINE 1 mL 30 gauge x 1/2\" Syrg Generic drug:  Insulin Syringe-Needle U-100  
  
 ketoconazole 2 % topical cream  
Commonly known as:  NIZORAL Apply  to affected area daily. L. acidoph & paracasei- S therm- Bifido 8 billion cell Cap cap Commonly known as:  JESUS-Q/RISAQUAD Take 1 Cap by mouth daily. levothyroxine 100 mcg tablet Commonly known as:  SYNTHROID Take 1 Tab by mouth Daily (before breakfast). losartan 25 mg tablet Commonly known as:  COZAAR Take 1 Tab by mouth daily. meloxicam 15 mg tablet Commonly known as:  MOBIC  
  
 naproxen 500 mg tablet Commonly known as:  NAPROSYN Take 1 Tab by mouth two (2) times daily (with meals). sildenafil citrate 100 mg tablet Commonly known as:  VIAGRA Take 1 Tab by mouth as needed. tadalafil 5 mg tablet Commonly known as:  CIALIS Take 1 Tab by mouth as needed. Take one hour prior to your activity  
  
 traZODone 100 mg tablet Commonly known as:  Teodoro Bump Take 1 Tab by mouth nightly. triamcinolone acetonide 40 mg/mL injection Commonly known as:  KENALOG-40  
1 mL by Intra artICUlar route once for 1 dose. TYLENOL ARTHRITIS PAIN 650 mg CR tablet Generic drug:  acetaminophen Take 650 mg by mouth every six (6) hours as needed for Pain. * VITAMIN D2 50,000 unit capsule Generic drug:  ergocalciferol TAKE ONE CAPSULE by MOUTH every 7 DAYS FOR 12 doses * ergocalciferol 50,000 unit capsule Commonly known as:  ERGOCALCIFEROL Take 1 Cap by mouth every seven (7) days. * Notice: This list has 4 medication(s) that are the same as other medications prescribed for you. Read the directions carefully, and ask your doctor or other care provider to review them with you. We Performed the Following [place] DRAIN/INJECT LARGE JOINT/BURSA [id number] CPT(R)] Follow-up Instructions Return in about 4 weeks (around 3/8/2017), or if symptoms worsen or fail to improve. Patient Instructions [employer] Activation Thank you for requesting access to [employer]. Please follow the instructions below to securely access and download your online medical record. [employer] allows you to send messages to your doctor, view your test results, renew your prescriptions, schedule appointments, and more. How Do I Sign Up? 1. In your internet browser, go to [email or web address] 
2. Click on the First Time User? Click Here link in the Sign In box. You will be redirect to the New Member Sign Up page. 3. Enter your [employer] Access Code exactly as it appears below. You will not need to use this code after youve completed the sign-up process. If you do not sign up before the expiration date, you must request a new code. [employer] Access Code: Activation code not generated Current [facility] Status: Patient Declined (This is the date your [facility] access code will ) 4. Enter the last four digits of your Social Security Number (xxxx) and Date of Birth (mm/dd/yyyy) as indicated and click Submit. You will be taken to the next sign-up page. 5. Create a [facility] ID. This will be your [facility] login ID and cannot be changed, so think of one that is secure and easy to remember. 6. Create a [facility] password. You can change your password at any time. 7. Enter your Password Reset Question and Answer. This can be used at a later time if you forget your password. 8. Enter your e-mail address. You will receive e-mail notification when new information is available in [address] 9. Click Sign Up. You can now view and download portions of your medical record. 10. Click the Download Summary menu link to download a portable copy of your medical information. Additional Information If you have questions, please visit the Frequently Asked Questions section of the [facility] website at [email or web address] Remember, [facility] is NOT to be used for urgent needs. For medical emergencies, dial 911. Please provide this summary of care documentation to your next provider. If you have any questions after today's visit, please call [phone].

## 2017-02-09 LAB
ALBUMIN SERPL-MCNC: 4.3 G/DL (ref 3.6–4.8)
ALBUMIN/GLOB SERPL: 1.7 {RATIO} (ref 1.1–2.5)
ALP SERPL-CCNC: 81 IU/L (ref 39–117)
ALT SERPL-CCNC: 12 IU/L (ref 0–44)
AST SERPL-CCNC: 13 IU/L (ref 0–40)
BILIRUB SERPL-MCNC: 0.5 MG/DL (ref 0–1.2)
BUN SERPL-MCNC: 16 MG/DL (ref 8–27)
BUN/CREAT SERPL: 17 (ref 10–22)
CALCIUM SERPL-MCNC: 9.7 MG/DL (ref 8.6–10.2)
CHLORIDE SERPL-SCNC: 100 MMOL/L (ref 96–106)
CO2 SERPL-SCNC: 28 MMOL/L (ref 18–29)
CREAT SERPL-MCNC: 0.94 MG/DL (ref 0.76–1.27)
ERYTHROCYTE [DISTWIDTH] IN BLOOD BY AUTOMATED COUNT: 14.1 % (ref 12.3–15.4)
GLOBULIN SER CALC-MCNC: 2.6 G/DL (ref 1.5–4.5)
GLUCOSE SERPL-MCNC: 93 MG/DL (ref 65–99)
HCT VFR BLD AUTO: 41.9 % (ref 37.5–51)
HGB BLD-MCNC: 14.3 G/DL (ref 12.6–17.7)
MCH RBC QN AUTO: 25 PG (ref 26.6–33)
MCHC RBC AUTO-ENTMCNC: 34.1 G/DL (ref 31.5–35.7)
MCV RBC AUTO: 73 FL (ref 79–97)
PLATELET # BLD AUTO: 311 X10E3/UL (ref 150–379)
POTASSIUM SERPL-SCNC: 4.3 MMOL/L (ref 3.5–5.2)
PROT SERPL-MCNC: 6.9 G/DL (ref 6–8.5)
RBC # BLD AUTO: 5.72 X10E6/UL (ref 4.14–5.8)
SODIUM SERPL-SCNC: 139 MMOL/L (ref 134–144)
WBC # BLD AUTO: 5.2 X10E3/UL (ref 3.4–10.8)

## 2017-04-27 RX ORDER — TRAZODONE HYDROCHLORIDE 100 MG/1
TABLET ORAL
Qty: 30 TAB | Refills: 3 | Status: SHIPPED | OUTPATIENT
Start: 2017-04-27